# Patient Record
Sex: MALE | Race: WHITE | NOT HISPANIC OR LATINO | URBAN - METROPOLITAN AREA
[De-identification: names, ages, dates, MRNs, and addresses within clinical notes are randomized per-mention and may not be internally consistent; named-entity substitution may affect disease eponyms.]

---

## 2017-09-30 ENCOUNTER — EMERGENCY (EMERGENCY)
Facility: HOSPITAL | Age: 32
LOS: 1 days | Discharge: HOME | End: 2017-09-30
Attending: EMERGENCY MEDICINE | Admitting: EMERGENCY MEDICINE
Payer: COMMERCIAL

## 2017-09-30 VITALS
SYSTOLIC BLOOD PRESSURE: 153 MMHG | RESPIRATION RATE: 18 BRPM | TEMPERATURE: 99 F | HEART RATE: 72 BPM | OXYGEN SATURATION: 100 % | DIASTOLIC BLOOD PRESSURE: 92 MMHG

## 2017-09-30 PROCEDURE — 99284 EMERGENCY DEPT VISIT MOD MDM: CPT | Mod: 25

## 2017-09-30 PROCEDURE — 90715 TDAP VACCINE 7 YRS/> IM: CPT

## 2017-09-30 PROCEDURE — 64450 NJX AA&/STRD OTHER PN/BRANCH: CPT | Mod: RT

## 2017-09-30 PROCEDURE — 73140 X-RAY EXAM OF FINGER(S): CPT

## 2017-09-30 PROCEDURE — 90471 IMMUNIZATION ADMIN: CPT

## 2017-09-30 PROCEDURE — 73140 X-RAY EXAM OF FINGER(S): CPT | Mod: 26,RT

## 2017-09-30 RX ORDER — ALBUTEROL 90 UG/1
0 AEROSOL, METERED ORAL
Qty: 0 | Refills: 0 | COMMUNITY

## 2017-09-30 RX ORDER — CEPHALEXIN 500 MG
500 CAPSULE ORAL ONCE
Qty: 0 | Refills: 0 | Status: COMPLETED | OUTPATIENT
Start: 2017-09-30 | End: 2017-09-30

## 2017-09-30 RX ORDER — TETANUS TOXOID, REDUCED DIPHTHERIA TOXOID AND ACELLULAR PERTUSSIS VACCINE, ADSORBED 5; 2.5; 8; 8; 2.5 [IU]/.5ML; [IU]/.5ML; UG/.5ML; UG/.5ML; UG/.5ML
0.5 SUSPENSION INTRAMUSCULAR ONCE
Qty: 0 | Refills: 0 | Status: COMPLETED | OUTPATIENT
Start: 2017-09-30 | End: 2017-09-30

## 2017-09-30 RX ORDER — CEPHALEXIN 500 MG
1 CAPSULE ORAL
Qty: 28 | Refills: 0 | OUTPATIENT
Start: 2017-09-30 | End: 2017-10-07

## 2017-09-30 RX ORDER — IBUPROFEN 200 MG
600 TABLET ORAL ONCE
Qty: 0 | Refills: 0 | Status: COMPLETED | OUTPATIENT
Start: 2017-09-30 | End: 2017-09-30

## 2017-09-30 RX ADMIN — TETANUS TOXOID, REDUCED DIPHTHERIA TOXOID AND ACELLULAR PERTUSSIS VACCINE, ADSORBED 0.5 MILLILITER(S): 5; 2.5; 8; 8; 2.5 SUSPENSION INTRAMUSCULAR at 13:44

## 2017-09-30 RX ADMIN — Medication 600 MILLIGRAM(S): at 13:41

## 2017-09-30 RX ADMIN — Medication 500 MILLIGRAM(S): at 14:27

## 2017-09-30 NOTE — ED PROCEDURE NOTE - ATTENDING CONTRIBUTION TO CARE
I have participated in and supervised all key portions of the above procedures and agree with the above documentation. MARGARITA Roberts MD
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
## 2017-09-30 NOTE — ED PROVIDER NOTE - PHYSICAL EXAMINATION
*Gen: NAD, AAO*3, well-appearing, well-nourished  *HEENT: NC/AT, MMM, airway patent, trachea midline, no signs of facial trauma  *CV: RRR, S1/S2 present, no murmurs/rubs/gallops  *Resp: no respiratory distress, LCTAB, no wheezing/rales/rhonchi  *Abd: non-distended, soft N/Tx4  *Neuro: no focal neuro deficits, moving all limbs appropriately, median/ulnar/radial nerve intact on right hand with testing  *Extremities: no gross deformity, PMS*4  *Skin: no rashes, no wounds   ~ Jenny Barker M.D. *Gen: NAD, AAO*3, well-appearing, well-nourished  *HEENT: NC/AT, MMM, airway patent, trachea midline, no signs of facial trauma  *CV: RRR, S1/S2 present, no murmurs/rubs/gallops  *Resp: no respiratory distress, LCTAB, no wheezing/rales/rhonchi  *Abd: non-distended, soft N/Tx4  *Neuro: no focal neuro deficits, moving all limbs appropriately, median/ulnar/radial nerve intact on right hand with testing  *Extremities: no gross deformity, PMS*4  *Skin: no rashes, + wounds (finger laceration)  ~ Jenny Barker M.D.

## 2017-09-30 NOTE — ED PROVIDER NOTE - SKIN AREA #1
1cm laceration over PIP joint dorsal surface with exposed tendon and bone, normal strenth with flexion and extension, cap refil <2 sec distally, sensation intact distally/dorsal

## 2017-09-30 NOTE — ED ADULT NURSE NOTE - OBJECTIVE STATEMENT
Pt with right second and third finger laceration.  On the knuckle of the index finger Wound cleaned and x-ray donne pending suture Goldie

## 2017-09-30 NOTE — ED PROVIDER NOTE - CARE PLAN
Principal Discharge DX:	Finger laceration Principal Discharge DX:	Finger laceration  Instructions for follow-up, activity and diet:	You have sustained a laceration to your right 2nd and 3rd fingers.  Follow-up with Dr. Greenfield within a week; her office number is [phone].      An antibiotic prescription has been sent to your pharmacy.  Please use as per package directions.  If you develop a rash, itchiness, tingling in your lips, swelling of your lips/tongue, and/or have difficulty breathing - you may be experiencing an allergic reaction and should be re-evaluated by a medical professional.  Please return promptly to the Emergency Department for further evaluation.     Follow-up with your Primary Care Physician within 24-48 hours.  Please return to the Emergency Department immediately for any new (increased redness, swelling, fever, chills, red streak going down fingers/arm), worsening or concerning symptoms; specifically those included in the attached information brochure.

## 2017-09-30 NOTE — ED PROVIDER NOTE - NS ED ROS FT
*Constitutional: no fevers, no chills  *Eyes: no eye pain, no blurred vision   *CV: no chest pain, no lightheadedness  *Resp: no shortness of breath, no cough, no wheezing  *GI: no abdominal pain, no nausea, no vomiting  *Neuro: no headache, no lightheadedness/dizziness  *MSK: + joint pain (finger laceration), no swelling, no redness  *Skin: no rashes, + finger laceration  *Heme/Lymph: + bleeding (from finger laceration), no bruising  *Allergic/Immunologic: no environmental allergies, no food allergies, no immunosuppressive disorder   ~ Jenny Barker M.D.
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
## 2017-09-30 NOTE — ED PROVIDER NOTE - PROGRESS NOTE DETAILS
Spoke with hand surgery - mentioned they would be in 30 minutes. Wound explored after digital block, laceration seems to involve joint capsule.  Hand surgery consulted, to see patient.  Will give keflex for wound ppx. Pt's laceration explored and repaired by plastic surgery attending.  Will dc on keflex for wound ppx and pt to f/u with plastic surgery. -Armando

## 2017-09-30 NOTE — ED PROVIDER NOTE - PLAN OF CARE
You have sustained a laceration to your right 2nd and 3rd fingers.  Follow-up with Dr. Greenfield within a week; her office number is [phone].      An antibiotic prescription has been sent to your pharmacy.  Please use as per package directions.  If you develop a rash, itchiness, tingling in your lips, swelling of your lips/tongue, and/or have difficulty breathing - you may be experiencing an allergic reaction and should be re-evaluated by a medical professional.  Please return promptly to the Emergency Department for further evaluation.     Follow-up with your Primary Care Physician within 24-48 hours.  Please return to the Emergency Department immediately for any new (increased redness, swelling, fever, chills, red streak going down fingers/arm), worsening or concerning symptoms; specifically those included in the attached information brochure.

## 2017-09-30 NOTE — ED PROVIDER NOTE - MEDICAL DECISION MAKING DETAILS
32 year-old male presents to the Emergency Department for for right finger lacerations over PIP joint.  Plan to do x-rays to rule-out fracture and foreign body.  Consult hand surgery to eval for joint capsule or tendinous involvement.  Neurovascularly intact and has full strength in his fingers.  Motrin 600mg and tetanus. 32 year-old male presents to the Emergency Department for for right finger lacerations (2nd and 3rd digit) over PIP joint.  Plan to do x-rays to rule-out fracture and foreign body.  Consult hand surgery to eval for joint capsule or tendinous involvement.  Neurovascularly intact and has full strength in his fingers.  Motrin 600mg and tetanus.
